# Patient Record
Sex: FEMALE | Race: WHITE
[De-identification: names, ages, dates, MRNs, and addresses within clinical notes are randomized per-mention and may not be internally consistent; named-entity substitution may affect disease eponyms.]

---

## 2017-01-20 ENCOUNTER — HOSPITAL ENCOUNTER (OUTPATIENT)
Dept: HOSPITAL 58 - AMBL | Age: 42
Discharge: HOME | End: 2017-01-20
Attending: INTERNAL MEDICINE

## 2017-01-20 VITALS — BODY MASS INDEX: 28.2 KG/M2

## 2017-01-20 DIAGNOSIS — R51: Primary | ICD-10-CM
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
## 2018-02-14 ENCOUNTER — HOSPITAL ENCOUNTER (EMERGENCY)
Facility: HOSPITAL | Age: 43
Discharge: HOME | End: 2018-02-14
Admitting: NURSE PRACTITIONER

## 2018-02-14 ENCOUNTER — APPOINTMENT (OUTPATIENT)
Dept: CT IMAGING | Facility: HOSPITAL | Age: 43
End: 2018-02-14

## 2018-02-14 VITALS
SYSTOLIC BLOOD PRESSURE: 135 MMHG | TEMPERATURE: 98 F | HEIGHT: 66 IN | OXYGEN SATURATION: 100 % | BODY MASS INDEX: 31.18 KG/M2 | WEIGHT: 194 LBS | HEART RATE: 89 BPM | RESPIRATION RATE: 17 BRPM | DIASTOLIC BLOOD PRESSURE: 89 MMHG

## 2018-02-14 DIAGNOSIS — D18.03 HEMANGIOMA OF LIVER: ICD-10-CM

## 2018-02-14 DIAGNOSIS — R10.12 LEFT UPPER QUADRANT PAIN: Primary | ICD-10-CM

## 2018-02-14 DIAGNOSIS — N39.0 ACUTE UTI: ICD-10-CM

## 2018-02-14 LAB
ALBUMIN SERPL-MCNC: 4.3 G/DL (ref 3.5–5)
ALBUMIN/GLOB SERPL: 1.2 G/DL (ref 1.1–2.5)
ALP SERPL-CCNC: 79 U/L (ref 24–120)
ALT SERPL W P-5'-P-CCNC: 29 U/L (ref 0–54)
AMYLASE SERPL-CCNC: 76 U/L (ref 30–110)
ANION GAP SERPL CALCULATED.3IONS-SCNC: 11 MMOL/L (ref 4–13)
AST SERPL-CCNC: 28 U/L (ref 7–45)
B-HCG UR QL: NEGATIVE
BACTERIA UR QL AUTO: ABNORMAL /HPF
BASOPHILS # BLD AUTO: 0.04 10*3/MM3 (ref 0–0.2)
BASOPHILS NFR BLD AUTO: 0.4 % (ref 0–2)
BILIRUB SERPL-MCNC: 0.3 MG/DL (ref 0.1–1)
BILIRUB UR QL STRIP: NEGATIVE
BUN BLD-MCNC: 8 MG/DL (ref 5–21)
BUN/CREAT SERPL: 11.9 (ref 7–25)
CALCIUM SPEC-SCNC: 9.5 MG/DL (ref 8.4–10.4)
CHLORIDE SERPL-SCNC: 103 MMOL/L (ref 98–110)
CLARITY UR: ABNORMAL
CO2 SERPL-SCNC: 30 MMOL/L (ref 24–31)
COLOR UR: YELLOW
CREAT BLD-MCNC: 0.67 MG/DL (ref 0.5–1.4)
D-LACTATE SERPL-SCNC: 0.7 MMOL/L (ref 0.5–2)
DEPRECATED RDW RBC AUTO: 40 FL (ref 40–54)
EOSINOPHIL # BLD AUTO: 0.06 10*3/MM3 (ref 0–0.7)
EOSINOPHIL NFR BLD AUTO: 0.6 % (ref 0–4)
ERYTHROCYTE [DISTWIDTH] IN BLOOD BY AUTOMATED COUNT: 12.1 % (ref 12–15)
GFR SERPL CREATININE-BSD FRML MDRD: 96 ML/MIN/1.73
GLOBULIN UR ELPH-MCNC: 3.7 GM/DL
GLUCOSE BLD-MCNC: 98 MG/DL (ref 70–100)
GLUCOSE UR STRIP-MCNC: NEGATIVE MG/DL
HCT VFR BLD AUTO: 42 % (ref 37–47)
HGB BLD-MCNC: 13.9 G/DL (ref 12–16)
HGB UR QL STRIP.AUTO: NEGATIVE
HOLD SPECIMEN: NORMAL
HOLD SPECIMEN: NORMAL
HYALINE CASTS UR QL AUTO: ABNORMAL /LPF
IMM GRANULOCYTES # BLD: 0.02 10*3/MM3 (ref 0–0.03)
IMM GRANULOCYTES NFR BLD: 0.2 % (ref 0–5)
INTERNAL NEGATIVE CONTROL: NEGATIVE
INTERNAL POSITIVE CONTROL: POSITIVE
KETONES UR QL STRIP: NEGATIVE
LEUKOCYTE ESTERASE UR QL STRIP.AUTO: ABNORMAL
LIPASE SERPL-CCNC: 115 U/L (ref 23–203)
LYMPHOCYTES # BLD AUTO: 2.98 10*3/MM3 (ref 0.72–4.86)
LYMPHOCYTES NFR BLD AUTO: 30.6 % (ref 15–45)
Lab: NORMAL
MCH RBC QN AUTO: 29.3 PG (ref 28–32)
MCHC RBC AUTO-ENTMCNC: 33.1 G/DL (ref 33–36)
MCV RBC AUTO: 88.6 FL (ref 82–98)
MONOCYTES # BLD AUTO: 0.45 10*3/MM3 (ref 0.19–1.3)
MONOCYTES NFR BLD AUTO: 4.6 % (ref 4–12)
NEUTROPHILS # BLD AUTO: 6.19 10*3/MM3 (ref 1.87–8.4)
NEUTROPHILS NFR BLD AUTO: 63.6 % (ref 39–78)
NITRITE UR QL STRIP: NEGATIVE
NRBC BLD MANUAL-RTO: 0 /100 WBC (ref 0–0)
PH UR STRIP.AUTO: <=5 [PH] (ref 5–8)
PLATELET # BLD AUTO: 314 10*3/MM3 (ref 130–400)
PMV BLD AUTO: 11.3 FL (ref 6–12)
POTASSIUM BLD-SCNC: 4 MMOL/L (ref 3.5–5.3)
PROCALCITONIN SERPL-MCNC: <0.25 NG/ML
PROT SERPL-MCNC: 8 G/DL (ref 6.3–8.7)
PROT UR QL STRIP: NEGATIVE
RBC # BLD AUTO: 4.74 10*6/MM3 (ref 4.2–5.4)
RBC # UR: ABNORMAL /HPF
REF LAB TEST METHOD: ABNORMAL
SODIUM BLD-SCNC: 144 MMOL/L (ref 135–145)
SP GR UR STRIP: 1.02 (ref 1–1.03)
SQUAMOUS #/AREA URNS HPF: ABNORMAL /HPF
UROBILINOGEN UR QL STRIP: ABNORMAL
WBC NRBC COR # BLD: 9.74 10*3/MM3 (ref 4.8–10.8)
WBC UR QL AUTO: ABNORMAL /HPF
WHOLE BLOOD HOLD SPECIMEN: NORMAL
WHOLE BLOOD HOLD SPECIMEN: NORMAL

## 2018-02-14 PROCEDURE — 83605 ASSAY OF LACTIC ACID: CPT | Performed by: NURSE PRACTITIONER

## 2018-02-14 PROCEDURE — 84145 PROCALCITONIN (PCT): CPT | Performed by: NURSE PRACTITIONER

## 2018-02-14 PROCEDURE — 81001 URINALYSIS AUTO W/SCOPE: CPT | Performed by: NURSE PRACTITIONER

## 2018-02-14 PROCEDURE — 99285 EMERGENCY DEPT VISIT HI MDM: CPT

## 2018-02-14 PROCEDURE — 74177 CT ABD & PELVIS W/CONTRAST: CPT

## 2018-02-14 PROCEDURE — 96374 THER/PROPH/DIAG INJ IV PUSH: CPT

## 2018-02-14 PROCEDURE — 96375 TX/PRO/DX INJ NEW DRUG ADDON: CPT

## 2018-02-14 PROCEDURE — 85025 COMPLETE CBC W/AUTO DIFF WBC: CPT | Performed by: NURSE PRACTITIONER

## 2018-02-14 PROCEDURE — 25010000002 HYDROMORPHONE PER 4 MG: Performed by: NURSE PRACTITIONER

## 2018-02-14 PROCEDURE — 0 IOPAMIDOL 61 % SOLUTION: Performed by: NURSE PRACTITIONER

## 2018-02-14 PROCEDURE — 80053 COMPREHEN METABOLIC PANEL: CPT | Performed by: NURSE PRACTITIONER

## 2018-02-14 PROCEDURE — 83690 ASSAY OF LIPASE: CPT | Performed by: NURSE PRACTITIONER

## 2018-02-14 PROCEDURE — 96376 TX/PRO/DX INJ SAME DRUG ADON: CPT

## 2018-02-14 PROCEDURE — 82150 ASSAY OF AMYLASE: CPT | Performed by: NURSE PRACTITIONER

## 2018-02-14 PROCEDURE — 87086 URINE CULTURE/COLONY COUNT: CPT | Performed by: NURSE PRACTITIONER

## 2018-02-14 PROCEDURE — 96361 HYDRATE IV INFUSION ADD-ON: CPT

## 2018-02-14 PROCEDURE — 25010000002 ONDANSETRON PER 1 MG: Performed by: NURSE PRACTITIONER

## 2018-02-14 RX ORDER — NITROFURANTOIN 25; 75 MG/1; MG/1
100 CAPSULE ORAL 2 TIMES DAILY
Qty: 14 CAPSULE | Refills: 0 | Status: SHIPPED | OUTPATIENT
Start: 2018-02-14 | End: 2018-02-21

## 2018-02-14 RX ORDER — KETOROLAC TROMETHAMINE 10 MG/1
10 TABLET, FILM COATED ORAL EVERY 6 HOURS PRN
Qty: 15 TABLET | Refills: 0 | Status: SHIPPED | OUTPATIENT
Start: 2018-02-14

## 2018-02-14 RX ORDER — ONDANSETRON 2 MG/ML
4 INJECTION INTRAMUSCULAR; INTRAVENOUS ONCE
Status: COMPLETED | OUTPATIENT
Start: 2018-02-14 | End: 2018-02-14

## 2018-02-14 RX ADMIN — ONDANSETRON 4 MG: 2 INJECTION, SOLUTION INTRAMUSCULAR; INTRAVENOUS at 20:03

## 2018-02-14 RX ADMIN — HYDROMORPHONE HYDROCHLORIDE 1 MG: 1 INJECTION, SOLUTION INTRAMUSCULAR; INTRAVENOUS; SUBCUTANEOUS at 21:05

## 2018-02-14 RX ADMIN — SODIUM CHLORIDE 1000 ML: 9 INJECTION, SOLUTION INTRAVENOUS at 19:58

## 2018-02-14 RX ADMIN — HYDROMORPHONE HYDROCHLORIDE 1 MG: 1 INJECTION, SOLUTION INTRAMUSCULAR; INTRAVENOUS; SUBCUTANEOUS at 20:03

## 2018-02-14 RX ADMIN — IOPAMIDOL 100 ML: 612 INJECTION, SOLUTION INTRAVENOUS at 21:32

## 2018-02-15 NOTE — ED PROVIDER NOTES
"Subjective   HPI Comments: Patient is a 42-year-old white female presents with left flank pain and left upper quadrant abdominal pain that started 5 days ago.  She denies any nausea or vomiting.  She states the pain has been worse today.  She denies any diarrhea.  She denies any fever or chills.    Patient is a 42 y.o. female presenting with abdominal pain.   History provided by:  Patient   used: No    Abdominal Pain       Review of Systems   Constitutional: Negative.    HENT: Negative.    Eyes: Negative.    Respiratory: Negative.    Cardiovascular: Negative.    Gastrointestinal: Positive for abdominal pain.        Patient is a 42-year-old white female presents with left flank pain and left upper quadrant abdominal pain that started 5 days ago.  She denies any nausea or vomiting.  She states the pain has been worse today.  She denies any diarrhea.  She denies any fever or chills.     Endocrine: Negative.    Genitourinary: Negative.    Musculoskeletal: Negative.    Skin: Negative.    Allergic/Immunologic: Negative.    Neurological: Negative.    Hematological: Negative.    Psychiatric/Behavioral: Negative.    All other systems reviewed and are negative.      History reviewed. No pertinent past medical history.    No Known Allergies    Past Surgical History:   Procedure Laterality Date   • APPENDECTOMY         History reviewed. No pertinent family history.    Social History     Social History   • Marital status:      Spouse name: N/A   • Number of children: N/A   • Years of education: N/A     Social History Main Topics   • Smoking status: Never Smoker   • Smokeless tobacco: None   • Alcohol use No   • Drug use: No   • Sexual activity: Not Asked     Other Topics Concern   • None     Social History Narrative   • None       Prior to Admission medications    Not on File       /89  Pulse 89  Temp 98 °F (36.7 °C) (Oral)   Resp 17  Ht 167.6 cm (66\")  Wt 88 kg (194 lb)  SpO2 100%  BMI " 31.31 kg/m2    Objective   Physical Exam   Constitutional: She is oriented to person, place, and time. She appears well-developed and well-nourished.   Appears to be in pain   HENT:   Head: Normocephalic and atraumatic.   Eyes: Conjunctivae and EOM are normal. Pupils are equal, round, and reactive to light.   Neck: Normal range of motion. Neck supple. No tracheal deviation present. No thyromegaly present.   Cardiovascular: Normal rate, regular rhythm, normal heart sounds and intact distal pulses.    Pulmonary/Chest: Effort normal and breath sounds normal. No respiratory distress. She has no wheezes. She has no rales. She exhibits no tenderness.   Abdominal: Soft. Bowel sounds are normal.   Moderate tenderness to luq and left cva on palpation and percussion. No guarding or rebound. bs x 4 quads   Musculoskeletal: Normal range of motion.   Neurological: She is alert and oriented to person, place, and time. She has normal reflexes. No cranial nerve deficit.   Skin: Skin is warm and dry.   Psychiatric: She has a normal mood and affect. Her behavior is normal. Judgment and thought content normal.   Nursing note and vitals reviewed.      Procedures         Lab Results (last 24 hours)     Procedure Component Value Units Date/Time    Urinalysis With / Culture If Indicated - Urine, Clean Catch [[id number]]  (Abnormal) Collected:  02/14/18 1950    Specimen:  Urine from Urine, Clean Catch Updated:  02/14/18 2035     Color, UA Yellow     Appearance, UA Cloudy (A)     pH, UA <=5.0     Specific Gravity, UA 1.016     Glucose, UA Negative     Ketones, UA Negative     Bilirubin, UA Negative     Blood, UA Negative     Protein, UA Negative     Leuk Esterase, UA Small (1+) (A)     Nitrite, UA Negative     Urobilinogen, UA 0.2 E.U./dL    Urine Culture - Urine, Urine, Clean Catch [[id number]] Collected:  02/14/18 1950    Specimen:  Urine from Urine, Clean Catch Updated:  02/14/18 2011    Urinalysis, Microscopic Only - Urine, Clean Catch  [[id number]]  (Abnormal) Collected:  02/14/18 1950    Specimen:  Urine from Urine, Clean Catch Updated:  02/14/18 2035     RBC, UA 6-12 (A) /HPF      WBC, UA 6-12 (A) /HPF      Bacteria, UA 1+ (A) /HPF      Squamous Epithelial Cells, UA 7-12 (A) /HPF      Hyaline Casts, UA 0-2 /LPF      Methodology Automated Microscopy    CBC & Differential [[id number]] Collected:  02/14/18 1957    Specimen:  Blood Updated:  02/14/18 2010    Narrative:       The following orders were created for panel order CBC & Differential.  Procedure                               Abnormality         Status                     ---------                               -----------         ------                     CBC Auto Differential[[id number]]         Normal              Final result                 Please view results for these tests on the individual orders.    Comprehensive Metabolic Panel [[id number]] Collected:  02/14/18 1957    Specimen:  Blood from Arm, Right Updated:  02/14/18 2020     Glucose 98 mg/dL      BUN 8 mg/dL      Creatinine 0.67 mg/dL      Sodium 144 mmol/L      Potassium 4.0 mmol/L      Chloride 103 mmol/L      CO2 30.0 mmol/L      Calcium 9.5 mg/dL      Total Protein 8.0 g/dL      Albumin 4.30 g/dL      ALT (SGPT) 29 U/L      AST (SGOT) 28 U/L      Alkaline Phosphatase 79 U/L      Total Bilirubin 0.3 mg/dL      eGFR Non African Amer 96 mL/min/1.73      Globulin 3.7 gm/dL      A/G Ratio 1.2 g/dL      BUN/Creatinine Ratio 11.9     Anion Gap 11.0 mmol/L     Lipase [[id number]]  (Normal) Collected:  02/14/18 1957    Specimen:  Blood from Arm, Right Updated:  02/14/18 2020     Lipase 115 U/L     Amylase [[id number]]  (Normal) Collected:  02/14/18 1957    Specimen:  Blood from Arm, Right Updated:  02/14/18 2020     Amylase 76 U/L     Procalcitonin [[id number]]  (Normal) Collected:  02/14/18 1957    Specimen:  Blood from Arm, Right Updated:  02/14/18 2038     Procalcitonin <0.25 ng/mL     Narrative:       SIRS, sepsis, severe sepsis, and septic  shock are categorized according to the criteria of the consensus conference of the American College of Chest Physicians/Society of Critical Care Medicine.    PCT < 0.5 ng/mL     Systemic infection (sepsis) is not likely.    PCT >0.5 and < 2.0 ng/mL Systemic infection (sepsis) is possible, but other conditions are known to elevate PCT as well.    PCT > 2.0 ng/mL     Systemic infection (sepsis) is likely, unless other causes are known.      PCT > 10.0 ng/mL    Important systemic inflammatory response, almost exclusively due to severe bacterial sepsis or septic shock.    PCT values of < 0.5 ng/mL do not exclude an infection, because localized infections (without systemic signs) may be associated with such low concentrations, or a systemic infection in its initial stages (<6 hours).  Increased PCT can occur without infection.  PCT concentrations between 0.5 and 2.0 ng/mL should be interpreted taking into account the patients history.  It is recommended to retest PCT within 6-24 hours if any concentrations < 2.0 ng/mL are obtained.    Lactic Acid, Plasma [[id number]]  (Normal) Collected:  02/14/18 1957    Specimen:  Blood from Arm, Right Updated:  02/14/18 2021     Lactate 0.7 mmol/L     CBC Auto Differential [[id number]]  (Normal) Collected:  02/14/18 1957    Specimen:  Blood from Arm, Right Updated:  02/14/18 2010     WBC 9.74 10*3/mm3      RBC 4.74 10*6/mm3      Hemoglobin 13.9 g/dL      Hematocrit 42.0 %      MCV 88.6 fL      MCH 29.3 pg      MCHC 33.1 g/dL      RDW 12.1 %      RDW-SD 40.0 fl      MPV 11.3 fL      Platelets 314 10*3/mm3      Neutrophil % 63.6 %      Lymphocyte % 30.6 %      Monocyte % 4.6 %      Eosinophil % 0.6 %      Basophil % 0.4 %      Immature Grans % 0.2 %      Neutrophils, Absolute 6.19 10*3/mm3      Lymphocytes, Absolute 2.98 10*3/mm3      Monocytes, Absolute 0.45 10*3/mm3      Eosinophils, Absolute 0.06 10*3/mm3      Basophils, Absolute 0.04 10*3/mm3      Immature Grans, Absolute 0.02 10*3/mm3       nRBC 0.0 /100 WBC     POC Pregnancy, Urine [[id number]]  (Normal) Collected:  02/14/18 2037    Specimen:  Urine Updated:  02/14/18 2038     HCG, Urine, QL Negative     Lot Number \[id number]\     Internal Positive Control Positive     Internal Negative Control Negative          CT Abdomen Pelvis With Contrast   ED Interpretation   1. Probable hemangioma in the right lobe of the liver unchanged from   2009   2. No acute intra-abdominal or pelvic abnormality.            This report was finalized on 02/14/2018 21:58 by Dr. Eugene Lang MD.      Final Result   1. Probable hemangioma in the right lobe of the liver unchanged from   2009   2. No acute intra-abdominal or pelvic abnormality.            This report was finalized on 02/14/2018 21:58 by Dr. Eugene Lang MD.      US liver    (Results Pending)       ED Course  ED Course   Comment By Time   Pending ct scan at this time Jenny Scherer, APRTRAVIS 02/14 2052   Reviewed results with pt- states that pain is better at this time. Advised of hemangioma to liver and advised will order o/p us of liver. Advised to decrease soda intake and increase water intake. Will be discharged shortly in stable condition Jenny Scherer, APRN 02/14 2214          MDM  Number of Diagnoses or Management Options  Acute UTI: minor  Hemangioma of liver: new and requires workup  Left upper quadrant pain: new and requires workup     Amount and/or Complexity of Data Reviewed  Clinical lab tests: ordered and reviewed  Tests in the radiology section of CPT®: ordered and reviewed  Independent visualization of images, tracings, or specimens: yes    Patient Progress  Patient progress: stable      Final diagnoses:   Left upper quadrant pain   Acute UTI   Hemangioma of liver          Jenny Scherer, OLGA  02/15/18 0044

## 2018-02-15 NOTE — ED NOTES
Pt reports left side abdominal pain and left side back pain for 4-5 days.     Susie Menon  02/14/18 4221

## 2018-02-16 LAB — BACTERIA SPEC AEROBE CULT: NORMAL

## 2018-02-16 NOTE — ED NOTES
"ED Call Back Questions    1. How are you doing since leaving the Emergency Department?    Doing much better  2. Do you have any questions about your discharge instructions? No     3. Have you filled your new prescriptions yet? Yes   a. Do you have any questions about those medications? No     4. Were you able to make a follow-up appointment with the physician? No     5. Do you have a primary care physician? No   a. If No, would you like for me to set you up with one? N/A  i. If Yes, “I will have our ED  give you a call right back at this number to work with you on the best time for an appointment.”    6. We are always looking to get better at what we do. Do you have any suggestions for what we can do to be even better? No   a. If Yes, \"Thank you for sharing your concerns. I apologize. I will follow up with our manager and patient . Would you like someone to call you back?\" No     7. Is there anything else I can do for you? No   Visit was very good     Octaviano Ny  02/16/18 1482    "

## 2018-02-22 ENCOUNTER — HOSPITAL ENCOUNTER (EMERGENCY)
Facility: HOSPITAL | Age: 43
Discharge: HOME | End: 2018-02-22
Admitting: EMERGENCY MEDICINE

## 2018-02-22 VITALS
OXYGEN SATURATION: 100 % | BODY MASS INDEX: 31.18 KG/M2 | RESPIRATION RATE: 15 BRPM | WEIGHT: 194 LBS | HEIGHT: 66 IN | SYSTOLIC BLOOD PRESSURE: 148 MMHG | HEART RATE: 95 BPM | DIASTOLIC BLOOD PRESSURE: 92 MMHG | TEMPERATURE: 98.2 F

## 2018-02-22 DIAGNOSIS — B02.9 HERPES ZOSTER WITHOUT COMPLICATION: Primary | ICD-10-CM

## 2018-02-22 PROCEDURE — 99282 EMERGENCY DEPT VISIT SF MDM: CPT

## 2018-02-22 RX ORDER — HYDROXYZINE PAMOATE 50 MG/1
50 CAPSULE ORAL 3 TIMES DAILY PRN
Qty: 21 CAPSULE | Refills: 0 | Status: SHIPPED | OUTPATIENT
Start: 2018-02-22

## 2018-02-22 RX ORDER — VALACYCLOVIR HYDROCHLORIDE 1 G/1
1000 TABLET, FILM COATED ORAL 2 TIMES DAILY
Qty: 31 TABLET | Refills: 0 | Status: SHIPPED | OUTPATIENT
Start: 2018-02-22

## 2018-02-22 RX ORDER — DICLOFENAC SODIUM 75 MG/1
75 TABLET, DELAYED RELEASE ORAL 2 TIMES DAILY
Qty: 14 TABLET | Refills: 0 | Status: SHIPPED | OUTPATIENT
Start: 2018-02-22

## 2018-02-23 NOTE — ED PROVIDER NOTES
Subjective   History of Present Illness  42-year-old female presents a chief complaint of rash that has been present for the past 2 weeks.  Patient reports she has a scab under her left breast and on her back she has 4 partially erupted lesions.  She reports these lesions itch and have a burning to stinging sensation as well as itching.  She reports sometimes it feels like shocks going through her.  The patient wonders if this could be shingles.  Patient has had chickenpox before and denies nausea vomiting diarrhea  Review of Systems   All other systems reviewed and are negative.      History reviewed. No pertinent past medical history.    No Known Allergies    Past Surgical History:   Procedure Laterality Date   • APPENDECTOMY         History reviewed. No pertinent family history.    Social History     Social History   • Marital status:      Spouse name: N/A   • Number of children: N/A   • Years of education: N/A     Social History Main Topics   • Smoking status: Never Smoker   • Smokeless tobacco: None   • Alcohol use No   • Drug use: No   • Sexual activity: Not Asked     Other Topics Concern   • None     Social History Narrative           Objective   Physical Exam   Constitutional: She is oriented to person, place, and time. She appears well-developed and well-nourished.   HENT:   Head: Normocephalic.   Eyes: EOM are normal. Pupils are equal, round, and reactive to light.   Neck: Normal range of motion.   Cardiovascular: Normal rate and regular rhythm.    Pulmonary/Chest: Effort normal.   Abdominal: Soft.   Musculoskeletal: Normal range of motion.   Lymphadenopathy:     She has no cervical adenopathy.   Neurological: She is alert and oriented to person, place, and time.   Skin: Skin is warm and dry.   Small crusted lesion under her left breast    4 partially erupted lesions left-sided thoracic spine   Psychiatric: She has a normal mood and affect. Her behavior is normal.   Nursing note and vitals  reviewed.      Procedures         ED Course  ED Course                  MDM  Number of Diagnoses or Management Options  Herpes zoster without complication: new and requires workup  Diagnosis management comments: Lesions do not necessarily have a fluid-filled vesicle broke or erythematous base but based on the description of pain to the fact that the lesions are aligned along the same dermatome and that the lesions on the spine.  A cluster support the fact that this could be a herpes zoster breakout.  We will treat with Valtrex twice a day along with Vistaril for itching and diclofenac for pain.    Risk of Complications, Morbidity, and/or Mortality  Presenting problems: moderate  Diagnostic procedures: moderate  Management options: moderate    Patient Progress  Patient progress: stable      Final diagnoses:   Herpes zoster without complication            Miguel Stevens PA-C  02/23/18 0132

## 2019-08-22 ENCOUNTER — HOSPITAL ENCOUNTER (EMERGENCY)
Dept: HOSPITAL 58 - ED | Age: 44
Discharge: HOME | End: 2019-08-22
Payer: COMMERCIAL

## 2019-08-22 VITALS — BODY MASS INDEX: 31.4 KG/M2

## 2019-08-22 VITALS — SYSTOLIC BLOOD PRESSURE: 143 MMHG | DIASTOLIC BLOOD PRESSURE: 81 MMHG | TEMPERATURE: 97.4 F
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
## 2019-08-22 DIAGNOSIS — S82.421A: Primary | ICD-10-CM

## 2019-08-22 PROCEDURE — 99283 EMERGENCY DEPT VISIT LOW MDM: CPT

## 2019-08-22 PROCEDURE — 96372 THER/PROPH/DIAG INJ SC/IM: CPT

## 2019-08-22 RX ADMIN — ONDANSETRON STA MG: 2 INJECTION INTRAMUSCULAR; INTRAVENOUS at 14:02

## 2019-08-22 RX ADMIN — MORPHINE SULFATE STA MG: 2 INJECTION, SOLUTION INTRAMUSCULAR; INTRAVENOUS at 15:52

## 2019-08-22 RX ADMIN — MORPHINE SULFATE STA MG: 2 INJECTION, SOLUTION INTRAMUSCULAR; INTRAVENOUS at 13:58

## 2019-08-22 NOTE — ED.PDOC
General


ED Provider: 


Dr. ARYA BEDOYA





Chief Complaint: Ankle Pain/Injury


Stated Complaint: Hyperextended R ankle; prior ORIF R ankle.


Time Seen by Physician: 13:46


Mode of Arrival: Wheelchair


Information Source: Patient


Exam Limitations: No limitations (Except for pain on initial intake R ankle)


Nursing and Triage Documentation Reviewed and Agree: Yes


Does patient meet sepsis criteria?: No


System Inflammatory Response Syndrome: Not Applicable


Sepsis Protocol: 


For patient's 13 years and over:





Temp is 96.8 and below  and greater


Pulse >90 BPM


Resp >20/minute


Acutely Altered Mental Status





Are patient's symptoms suggestive of a new infection, such as:


   -Pneumonia


   -Skin, Soft Tissue


   -Endocarditis


   -UTI


   -Bone, Joint Infection


   -Implantable Device


   -Acute Abdominal Infection


   -Wound Infection


   -Meningitis


   -Blood Stream Catheter Infection


   -Unknown








Review of Systems





- Review Of Systems


Constitutional: Reports: No symptoms


Respiratory: Reports: No symptoms


Musculoskeletal: Reports: Other (obvious mild deformity R lateral ankle)


Skin: Reports: Bruising (developing ecchymosis)


Neurological: Reports: No symptoms


All Other Systems: Reviewed and Negative





Past Medical History





- Past Medical History


Previously Healthy: Yes


Endocrine: Reports: None


Cardiovascular: Reports: None


Respiratory: Reports: None


Hematological: Reports: None


Gastrointestinal: Reports: None


Genitourinary: Reports: None


Neuro/Psych: Reports: Anxiety, Depression


Musculoskeletal: Reports: None


Cancer: Reports: None


Last Menstrual Period: unknown





- Surgical History


General Surgical History: Reports: Appendectomy





- Family History


Family History: Reports: None





- Social History


Smoking Status: Never smoker


Hx Substance Use: No


Alcohol Screening: Occasionally





Physical Exam





- Physical Exam


Appearance: Well-appearing


Ill-appearing: None


Pain Distress: Moderate


Respiratory: Airway patent


Musculoskeletal: Limited ROM (Pain, deformity R lower ankle)


Skin: Warm, Dry, Normal color (Developing ecchymosis R lateral ankle)


Neurological: Sensation intact, Motor intact, Alert, Oriented


Psychiatric: Affect appropriate, Mood appropriate





Interpretation





- Radiology Interpretation


Radiology Interpretation By: Radiologist


Radiology Results: Positive


Exam Interpreted: Other (R ankle)


Xray Comments: fx distal fibula anove prior fixator; also distal tibia poterior 

malleolus





Re-Evaluation





- Re-Evaluation


Time of Re-Evaluation: 15:20


Status: Improved


Vital Signs Stable: Yes


Appearance: Other (Pain R LE/ankle - improved now with splint in place)


Skin: Warm and Dry


Neuro: Alert and Oriented X3


CV: RRR





Critical Care Note





- Critical Care Note


Total Time (mins): 25





Course





- Course


Orders, Labs, Meds: 


Orders











 Category Date Time Status


 


 CRUTCHES [ED CRUTCHES] .ONCE EMERGENCY  08/22/19 15:32 Active


 


 Morphine Sulfate [Morphine 2 mg/ml Syringe] MEDS  08/22/19 13:50 Discontinued





 4 mg IM ONCE STA   


 


 Morphine Sulfate [Morphine 2 mg/ml Syringe] MEDS  08/22/19 15:32 Discontinued





 4 mg IM ONCE STA   


 


 Ondansetron HCl/Pf [Zofran 4 mg/2 ml] MEDS  08/22/19 13:51 Discontinued





 4 mg IM ONCE STA   


 


 ANKLE, RIGHT MIN 3 VIEWS Stat RADS  08/22/19 13:48 Completed








Medications














Discontinued Medications














Generic Name Dose Route Start Last Admin





  Trade Name Freq  PRN Reason Stop Dose Admin


 


Morphine Sulfate  4 mg  08/22/19 13:50  08/22/19 13:58





  Morphine 2 Mg/Ml Syringe  IM  08/22/19 13:51  4 mg





  ONCE STA   Administration





     





     





     





     


 


Morphine Sulfate  4 mg  08/22/19 15:32  





  Morphine 2 Mg/Ml Syringe  IM  08/22/19 15:33  





  ONCE STA   





     





     





     





     


 


Ondansetron HCl  4 mg  08/22/19 13:51  08/22/19 14:02





  Zofran 4 Mg/2 Ml  IM  08/22/19 13:52  4 mg





  ONCE STA   Administration





     





     





     





     











Vital Signs: 


 











  Temp Pulse Resp BP Pulse Ox


 


 08/22/19 13:24  97.4 F L  97 H  22  143/81 H  99














Departure





- Departure


Time of Disposition: 15:39


Disposition: HOME SELF-CARE


Discharge Problem: 


Fracture, fibula closed, shaft


Qualifiers:


 Encounter type: initial encounter Fracture morphology: transverse Fracture 

alignment: displaced Laterality: right Qualified Code(s): S82.421A - Displaced 

transverse fracture of shaft of right fibula, initial encounter for closed 

fracture





Instructions:  Ankle Fracture (ED)


Condition: Good


Pt referred to PMD for follow-up: Yes (Appointment tomorrow)


IPMP verified?: No


Additional Instructions: 


Be sure to keep your appointment with primary care provider Brenda Valiente at 10 O

'clock tomorrow morning.  Elevate Right lower leg when able today, tonight and 

in the morning.  Use pain medication as prescribed.  Use crutches for weight 

beaning when  you must be upright.


Prescriptions: 


Hydrocodone Bit/Acetaminophen [Norco 7.5-325] 1 tab PO Q6HR PRN #14 tablet


 PRN Reason: pain


Allergies/Adverse Reactions: 


Allergies





Latex, Natural Rubber Adverse Reaction (Verified 08/22/19 13:35)


 Rash








Home Medications: 


Ambulatory Orders





Hydrocodone Bit/Acetaminophen [Norco 7.5-325] 1 tab PO Q6HR PRN #14 tablet 08/22 /19

## 2019-08-22 NOTE — DI
EXAM:  Right ankle.  [facility] 

  

HISTORY:  Twisted right foot, deformity 

  

COMPARISON:  None 

  

FINDINGS:  Surgical side plate with multiple fixation screws in the distal fibula.  There is a mild t
o moderately displaced fracture of the distal fibula just above the surgical fixation level.  There i
s a fracture of the distal tibia that involves the posterior malleolus and likely the medial malleolu
s.  Ankle mortise symmetric.  Small plantar calcaneal spur. 

  

IMPERSSION:  Fractures of the distal tibia and fibula as described.

## 2019-08-26 ENCOUNTER — HOSPITAL ENCOUNTER (EMERGENCY)
Dept: HOSPITAL 58 - ED | Age: 44
Discharge: HOME | End: 2019-08-26

## 2019-08-26 VITALS — SYSTOLIC BLOOD PRESSURE: 121 MMHG | TEMPERATURE: 98.9 F | DIASTOLIC BLOOD PRESSURE: 80 MMHG

## 2019-08-26 VITALS — BODY MASS INDEX: 31.4 KG/M2

## 2019-08-26 DIAGNOSIS — Z76.0: ICD-10-CM

## 2019-08-26 DIAGNOSIS — M79.661: Primary | ICD-10-CM

## 2019-08-26 PROCEDURE — 99282 EMERGENCY DEPT VISIT SF MDM: CPT

## 2019-08-26 NOTE — ED.PDOC
General


ED Provider: 


Dr. KANWAL GRISSOM





Chief Complaint: Ankle Pain/Injury


Stated Complaint: LOWER LEG FRACTURE OUT OF PAIN MEDS UNABLE TO SEE  ORTHO


Time Seen by Physician: 13:30


Mode of Arrival: Walk-In


Information Source: Patient


Exam Limitations: No limitations


Nursing and Triage Documentation Reviewed and Agree: Yes


Does patient meet sepsis criteria?: No


System Inflammatory Response Syndrome: Not Applicable


Sepsis Protocol: 


For patient's 13 years and over:





Temp is 96.8 and below  and greater


Pulse >90 BPM


Resp >20/minute


Acutely Altered Mental Status





Are patient's symptoms suggestive of a new infection, such as:


   -Pneumonia


   -Skin, Soft Tissue


   -Endocarditis


   -UTI


   -Bone, Joint Infection


   -Implantable Device


   -Acute Abdominal Infection


   -Wound Infection


   -Meningitis


   -Blood Stream Catheter Infection


   -Unknown








Musculoskeletal Complaint Exam





- Ankle/Foot Complaint/Exam


Location of Injury: Reports: Right, Ankle, Foot


Mechanism of Injury: Reports: Trauma


Onset/Duration: DAYS X4


Symptoms Are: Reports: Still present


Onset of Pain: Reports: Immediate


Character: Reports: Aching


Alleviating: Reports: Rest


Aggravating: Reports: Movement


Able to Bear Weight: No


Lower Extremity Findings: Present: Swelling


Tenderness: Present: Medial malleolus, Lateral malleolus


Limited Range of Motion: Present: Inversion


Differential Diagnosis: Closed Fracture





Review of Systems





- Review Of Systems


Constitutional: Reports: No symptoms


Eyes: Reports: No symptoms


Ears, Nose, Mouth, Throat: Reports: No symptoms


Respiratory: Reports: No symptoms


Cardiac: Reports: No symptoms


GI: Reports: No symptoms


: Reports: No symptoms


Musculoskeletal: Reports: Other (PAIN LOWER RIGHT LEG)


Skin: Reports: No symptoms


Neurological: Reports: No symptoms


Endocrine: Reports: No symptoms


Hematologic/Lymphatic: Reports: No symptoms


All Other Systems: Reviewed and Negative





Past Medical History





- Past Medical History


Previously Healthy: Yes


Endocrine: Reports: None


Cardiovascular: Reports: None


Respiratory: Reports: None


Hematological: Reports: None


Gastrointestinal: Reports: None


Genitourinary: Reports: None


Neuro/Psych: Reports: Anxiety, Depression


Musculoskeletal: Reports: None


Cancer: Reports: None


Last Menstrual Period: n/a





- Surgical History


General Surgical History: Reports: Appendectomy





- Family History


Family History: Reports: None





- Social History


Smoking Status: Never smoker


Hx Substance Use: No


Alcohol Screening: Occasionally





- Immunizations


Tetanus Shot up to Date: No





Physical Exam





- Physical Exam


Appearance: Well-appearing, No pain distress, Well-nourished


Eyes: TONY, EOMI, Conjunctiva clear


ENT: Ears normal, Nose normal, Oropharynx normal


Respiratory: Airway patent, Breath sounds clear, Breath sounds equal, 

Respirations nonlabored


Cardiovascular: RRR, Pulses normal, No rub, No murmur


GI/: Soft, Nontender, No masses, Bowel sounds normal, No Organomegaly


Musculoskeletal: Limited ROM (RIGHT LEG)


Skin: Warm, Dry, Normal color


Neurological: Sensation intact, Motor intact, Reflexes intact, Cranial nerves 

intact, Alert, Oriented


Psychiatric: Affect appropriate, Mood appropriate





Critical Care Note





- Critical Care Note


Total Time (mins): 0





Course





- Course


Vital Signs: 





 











  Temp Pulse Resp BP Pulse Ox


 


 08/26/19 13:29  98.9 F  111 H  18  121/80  98














Departure





- Departure


Time of Disposition: 13:52


Disposition: HOME SELF-CARE


Discharge Problem: 


 Encounter for medication refill





Instructions:  Medicine Refill (ED)


Condition: Good


Pt referred to PMD for follow-up: Yes


IPMP verified?: No


Additional Instructions: 


Please call your Family Physician as soon as possible to schedule a follow-up 

appointment.


Allergies/Adverse Reactions: 


Allergies





Latex, Natural Rubber Adverse Reaction (Verified 08/22/19 13:35)


 Rash








Home Medications: 


Ambulatory Orders





Hydrocodone Bit/Acetaminophen [Norco 7.5-325] 1 tab PO Q6HR PRN #14 tablet 08/22 /19

## 2019-09-10 ENCOUNTER — TRANSCRIBE ORDERS (OUTPATIENT)
Dept: ADMINISTRATIVE | Facility: HOSPITAL | Age: 44
End: 2019-09-10

## 2019-09-10 DIAGNOSIS — S82.391A FRACTURE, POSTERIOR MALLEOLUS, RIGHT, CLOSED, INITIAL ENCOUNTER: Primary | ICD-10-CM

## 2019-09-12 ENCOUNTER — APPOINTMENT (OUTPATIENT)
Dept: CT IMAGING | Facility: HOSPITAL | Age: 44
End: 2019-09-12